# Patient Record
Sex: MALE | Race: WHITE | NOT HISPANIC OR LATINO | Employment: FULL TIME | URBAN - METROPOLITAN AREA
[De-identification: names, ages, dates, MRNs, and addresses within clinical notes are randomized per-mention and may not be internally consistent; named-entity substitution may affect disease eponyms.]

---
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
## 2021-01-18 ENCOUNTER — HOSPITAL ENCOUNTER (EMERGENCY)
Facility: CLINIC | Age: 21
Discharge: HOME | End: 2021-01-18
Admitting: EMERGENCY MEDICINE
Payer: COMMERCIAL

## 2021-01-18 VITALS
WEIGHT: 170 LBS | RESPIRATION RATE: 20 BRPM | HEART RATE: 58 BPM | BODY MASS INDEX: 23.03 KG/M2 | OXYGEN SATURATION: 96 % | SYSTOLIC BLOOD PRESSURE: 131 MMHG | TEMPERATURE: 98.7 F | DIASTOLIC BLOOD PRESSURE: 79 MMHG | HEIGHT: 72 IN

## 2021-01-18 LAB
BASOPHILS # BLD AUTO: 0.1 10E9/L (ref 0–0.2)
BASOPHILS NFR BLD AUTO: 0.7 %
DIFFERENTIAL METHOD BLD: NORMAL
EOSINOPHIL # BLD AUTO: 0.2 10E9/L (ref 0–0.7)
EOSINOPHIL NFR BLD AUTO: 1.9 %
ERYTHROCYTE [DISTWIDTH] IN BLOOD BY AUTOMATED COUNT: 12.7 % (ref 10–15)
HCT VFR BLD AUTO: 50.9 % (ref 40–53)
HGB BLD-MCNC: 17.3 G/DL (ref 13.3–17.7)
IMM GRANULOCYTES # BLD: 0.1 10E9/L (ref 0–0.4)
IMM GRANULOCYTES NFR BLD: 1.1 %
LYMPHOCYTES # BLD AUTO: 1.9 10E9/L (ref 0.8–5.3)
LYMPHOCYTES NFR BLD AUTO: 23.2 %
MCH RBC QN AUTO: 29.8 PG (ref 26.5–33)
MCHC RBC AUTO-ENTMCNC: 34 G/DL (ref 31.5–36.5)
MCV RBC AUTO: 88 FL (ref 78–100)
MONOCYTES # BLD AUTO: 0.5 10E9/L (ref 0–1.3)
MONOCYTES NFR BLD AUTO: 6.2 %
NEUTROPHILS # BLD AUTO: 5.5 10E9/L (ref 1.6–8.3)
NEUTROPHILS NFR BLD AUTO: 66.9 %
NRBC # BLD AUTO: 0 10*3/UL
NRBC BLD AUTO-RTO: 0 /100
PLATELET # BLD AUTO: 233 10E9/L (ref 150–450)
PLATELET # BLD EST: NORMAL 10*3/UL
RBC # BLD AUTO: 5.81 10E12/L (ref 4.4–5.9)
RBC MORPH BLD: NORMAL
WBC # BLD AUTO: 8.3 10E9/L (ref 4–11)

## 2021-01-18 PROCEDURE — 999N000104 HC STATISTIC NO CHARGE

## 2021-01-18 PROCEDURE — 85025 COMPLETE CBC W/AUTO DIFF WBC: CPT | Performed by: EMERGENCY MEDICINE

## 2021-01-18 ASSESSMENT — MIFFLIN-ST. JEOR: SCORE: 1824.11

## 2021-01-19 NOTE — ED TRIAGE NOTES
"Pt c/o 5/10 intermittent lower abdominal pain that started this AM. Pt states that it radiates down to his \"bladder\" Denies any N/V or urinary symptoms. Hx of Kidney Stones.  "

## 2021-06-04 VITALS
TEMPERATURE: 98 F | HEIGHT: 73 IN | HEART RATE: 70 BPM | WEIGHT: 168 LBS | SYSTOLIC BLOOD PRESSURE: 116 MMHG | BODY MASS INDEX: 22.26 KG/M2 | DIASTOLIC BLOOD PRESSURE: 55 MMHG

## 2021-06-04 VITALS — HEIGHT: 73 IN | BODY MASS INDEX: 21.11 KG/M2

## 2021-06-05 NOTE — PROGRESS NOTES
Assessment/Plan:        Diagnoses and all orders for this visit:    Calculus of kidney  -     Magnesium, 24 Hour Urine; Future  -     Stone Formation, 24 Hour Urine (does not include Magnesium); Standing  -     Uric Acid; Future; Expected date: 01/29/2020  -     Calcium, Ionized, Measured; Future; Expected date: 01/29/2020  -     Parathyroid Hormone Intact with Minerals; Future; Expected date: 01/29/2020  -     Patient Stated Goal: Prevent further stones  -     24 Hour Urine Collection Steps Education    Calculus of ureter  -     Urinalysis Macroscopic    Other orders  -     methylphenidate HCl (RITALIN) 20 MG tablet; Take 20 mg by mouth.      Stone Management Plan  [facility] Stone Management 1/15/2020   Urinary Tract Infection No suspicion of infection   Renal Colic Asymptomatic at this time   Renal Failure No suspicion of renal failure   Current CT date 1/9/2020   Right sided stones? Yes   R Number of ureteral stones 1   R GSD of ureteral stones 2   R Location of ureteral stone Mid   R Number of kidney stones  No renal stones   R GSD of kidney stones N/A   R Hydronephrosis Mild   R Stone Event New stone passed prior to visit   Left sided stones? Yes   L Number of ureteral stones No ureteral stones   L Number of kidney stones  2   L GSD of kidney stones 2 - 4   L Hydronephrosis None   L Stone Event No current event   L Current Plan Observe   Observe rationale Limited stone burden with good prognosis for spontaneous passage               Subjective:      HPI  Mr. Tavo Santana is a 19 y.o.  male presenting to the [facility] [place] following recent [facility] ER visit for urolithiasis.    He is a rapidly recurrent unidentified composition stone former who has not required stone clearance procedures. He has not previously participated in stone risk evaluation. He has no identified modifiable stone risk factors. He has identified non-modifiable stone risks including:  early age at first  stone.    He was seen in ER 1/8/20 for acute onset right abdominal pain x few hours. The pain was achy, constant and severe with radiation to the right back. He had associated nausea. Workup was notable for CT reporting an obstructing 3 mm right ureteral stone. Labs were negative for evidence of infection or renal injury. He had intractable pain despite medications and was admitted for observation. He passed the stone in interval with resolution of pain. He states the stone broke apart once collected and brings in a ziploc bag with several tiny yellow stone pieces.    He is asymptomatic at present. He denies symptoms of fever, chills, flank pain, nausea, vomiting, urinary frequency and dysuria.     CT scan from 1/9/20 is personally reviewed and demonstrates an obstructing 2 mm right mid ureteral stone which has passed in interval. There are 2 left renal stones, largest 4 mm.    Significant labs from presentation include moderate hematuria, no pyuria, negative nitrite, no bacteria, normal WBC, normal creatinine and normal potassium.    PLAN    20 yo M with with ADHD and hx of stone disease, no previous surgical stone interventions. Interval passage of small right ureteral stone. Nonobstructing left renal stones, new since 2018.    Will initiate comprehensive stone risk evaluation. Serum stone risk chemistries including uric acid, parathyroid hormone and ionized calcium will be obtained before next visit. Two 24 hour urine collections and dietary journal will be obtained at earliest covenience. Patient verbalized understanding. Patient agrees with plan as discussed. He will return to clinic when labs are available.      Patient also seen and examined by LEXX Lee   Review of Systems  A 12 point comprehensive review of systems is negative except for HPI    Past Medical History:   Diagnosis Date     ADHD      Kidney stone     5/2018 and 1/2020       No past surgical history on file.    Current  Outpatient Medications   Medication Sig Dispense Refill     methylphenidate HCl (RITALIN) 20 MG tablet Take 20 mg by mouth.       No current facility-administered medications for this visit.        Allergies no known allergies    Social History     Socioeconomic History     Marital status: Single     Spouse name: Not on file     Number of children: Not on file     Years of education: Not on file     Highest education level: Not on file   Occupational History     Occupation: student   Social Needs     Financial resource strain: Not on file     Food insecurity:     Worry: Not on file     Inability: Not on file     Transportation needs:     Medical: Not on file     Non-medical: Not on file   Tobacco Use     Smoking status: Never Smoker     Smokeless tobacco: Current User   Substance and Sexual Activity     Alcohol use: Not Currently     Drug use: Yes     Types: Marijuana     Sexual activity: Not on file   Lifestyle     Physical activity:     Days per week: Not on file     Minutes per session: Not on file     Stress: Not on file   Relationships     Social connections:     Talks on phone: Not on file     Gets together: Not on file     Attends Ca[employer] service: Not on file     Active member of club or organization: Not on file     Attends meetings of clubs or organizations: Not on file     Relationship status: Not on file     Intimate partner violence:     Fear of current or ex partner: Not on file     Emotionally abused: Not on file     Physically abused: Not on file     Forced sexual activity: Not on file   Other Topics Concern     Not on file   Social History Narrative     Not on file       Family History   Problem Relation Age of Onset     No Medical Problems Mother      No Medical Problems Father      No Medical Problems Sister      No Medical Problems Brother      No Medical Problems Maternal Aunt      No Medical Problems Maternal Uncle      No Medical Problems Paternal Aunt      Cancer Maternal Grandfather          pancreatic     Cancer Paternal Grandfather         lung        Objective:      Physical Exam  Vitals:    01/15/20 1458   BP: 116/55   Pulse: 70   Temp: 98  F (36.7  C)     General - well developed, well nourished, appropriate for age. Appears no distress at this time  Abdomen - slender soft, non-tender, no hepatosplenomegaly, no masses.   - no flank tenderness, no suprapubic tenderness, kidney and bladder non-palpable  MSK - normal spinal curvature. no spinal tenderness. normal gait. muscular strength intact.  Psych - oriented to time, place, and person, normal mood and affect.      Labs  Urinalysis POC (Office):  No results found for: BLOODUA, NITRITE, LEUKOCYTESUA, PHUA    Lab Urinalysis:  No results found for: HGBUA, NITRITE, LEUKOCYTESUR, PHUR and Acute Labs CBC No results found for: WBC, HGB, PLT and Renal Panel  KSINo results found for: CREATININE, CRCLEARANCE, K, CALCIUM      I, Eliu Patrick, personally saw and examined the patient, reviewed most recent labs and imaging and agree with the above assessment

## 2021-06-05 NOTE — PATIENT INSTRUCTIONS - HE
Patient Stated Goal: Prevent further stones  Steps for collecting a 24 hour urine specimen    Please follow the directions carefully. All urine voided for a 24-hour period needs to be collected into the jug.  DO NOT change any of your  normal  daily habits when doing this test. Continue to follow your regular diet, intake of fluids, and usual activity level. Pick the most convenient day with your schedule, perhaps on a weekend or a day off.    Start your Diet Log the day before collection and continue on the day of urine collection.  You MUST bring Diet Log with you on follow up visit to discuss results.    One 24hr Urine Collection     Two 24hr Urine Collections  (do not collect on consecutive days)    PLEASE COMPLETE THE 2nd JUG WITHIN 1-2 WEEKS FROM THE 1st JUG    STEP 1  Empty your bladder completely into the toilet. This will be your start time. Write your full legal name, start date and time on the jug label.  Collection start and stop times need to match exactly!  For example:  6 am to 6 am.    STEP 2  The next time you urinate, empty your bladder directly into the jug or collection hat and pour urine into the jug.  Screw the lid back onto the jug.  Do not spill!    STEP 3  Place the jug in the refrigerator or a cooler with ice during the collection period.  Failure to keep it cool could cause inaccurate test results. DO NOT Freeze.    STEP 4  Continue collecting all urine into the jug for the rest of the day, for the full 24 hours.  DO NOT stop early or go over 24 hours!    STEP 5  Exactly 24 hours from start of collection, write your full legal name, stop date and time on the jug label.   Collection start and stop times need to match exactly!  For example:  6 am to 6 am.  Failure to label correctly will result in recollection of urine specimen.    STEP 6  Return each jug within 24 hours after final urination.     STEP 7  Drop off jug locations:   [facility]: Mon-Fri 7am-7pm - Closed on  weekends  [facility]: Mon-Fri 7am-5pm - Closed on Sunday  [facility]: Mon-Fri 7am-6:30pm - Closed on weekends    STEP 8  Please call [facility] after return of your final jug to schedule your follow-up visit. [phone]

## 2021-06-05 NOTE — PROGRESS NOTES
Patient presents to the office today for a ureteral stone consultation from [facility].  He was last seen by Dr. Patrick at the [facility] in 2018.

## 2022-12-19 NOTE — ED NOTES
Bed: ED13  Expected date:   Expected time:   Means of arrival:   Comments:  triage   Impression: Age-related nuclear cataract, bilateral: H25.13. Bilateral. Plan: Discussed condition. Continue to monitor without treatment at this time.

## 2023-11-11 ENCOUNTER — APPOINTMENT (OUTPATIENT)
Dept: CT IMAGING | Facility: CLINIC | Age: 23
End: 2023-11-11
Attending: EMERGENCY MEDICINE
Payer: COMMERCIAL

## 2023-11-11 ENCOUNTER — HOSPITAL ENCOUNTER (EMERGENCY)
Facility: CLINIC | Age: 23
Discharge: HOME | End: 2023-11-11
Attending: EMERGENCY MEDICINE | Admitting: EMERGENCY MEDICINE
Payer: COMMERCIAL

## 2023-11-11 VITALS
RESPIRATION RATE: 16 BRPM | HEART RATE: 82 BPM | TEMPERATURE: 97.4 F | SYSTOLIC BLOOD PRESSURE: 119 MMHG | WEIGHT: 190 LBS | BODY MASS INDEX: 25.73 KG/M2 | HEIGHT: 72 IN | OXYGEN SATURATION: 100 % | DIASTOLIC BLOOD PRESSURE: 70 MMHG

## 2023-11-11 DIAGNOSIS — N23 RENAL COLIC: ICD-10-CM

## 2023-11-11 LAB
ALBUMIN SERPL BCG-MCNC: 4.8 G/DL (ref 3.5–5.2)
ALBUMIN UR-MCNC: NEGATIVE MG/DL
ALP SERPL-CCNC: 69 U/L (ref 40–129)
ALT SERPL W P-5'-P-CCNC: 34 U/L (ref 0–70)
ANION GAP SERPL CALCULATED.3IONS-SCNC: 12 MMOL/L (ref 7–15)
APPEARANCE UR: CLEAR
AST SERPL W P-5'-P-CCNC: 29 U/L (ref 0–45)
BASOPHILS # BLD AUTO: 0 10E3/UL (ref 0–0.2)
BASOPHILS NFR BLD AUTO: 0 %
BILIRUB SERPL-MCNC: 0.6 MG/DL
BILIRUB UR QL STRIP: NEGATIVE
BUN SERPL-MCNC: 13.5 MG/DL (ref 6–20)
CALCIUM SERPL-MCNC: 9.5 MG/DL (ref 8.6–10)
CHLORIDE SERPL-SCNC: 103 MMOL/L (ref 98–107)
COLOR UR AUTO: ABNORMAL
CREAT SERPL-MCNC: 1.05 MG/DL (ref 0.67–1.17)
DEPRECATED HCO3 PLAS-SCNC: 25 MMOL/L (ref 22–29)
EGFRCR SERPLBLD CKD-EPI 2021: >90 ML/MIN/1.73M2
EOSINOPHIL # BLD AUTO: 0.3 10E3/UL (ref 0–0.7)
EOSINOPHIL NFR BLD AUTO: 3 %
ERYTHROCYTE [DISTWIDTH] IN BLOOD BY AUTOMATED COUNT: 12 % (ref 10–15)
GLUCOSE SERPL-MCNC: 93 MG/DL (ref 70–99)
GLUCOSE UR STRIP-MCNC: NEGATIVE MG/DL
HCT VFR BLD AUTO: 41.8 % (ref 40–53)
HGB BLD-MCNC: 14.3 G/DL (ref 13.3–17.7)
HGB UR QL STRIP: ABNORMAL
IMM GRANULOCYTES # BLD: 0 10E3/UL
IMM GRANULOCYTES NFR BLD: 0 %
KETONES UR STRIP-MCNC: NEGATIVE MG/DL
LEUKOCYTE ESTERASE UR QL STRIP: ABNORMAL
LYMPHOCYTES # BLD AUTO: 3.3 10E3/UL (ref 0.8–5.3)
LYMPHOCYTES NFR BLD AUTO: 36 %
MCH RBC QN AUTO: 29 PG (ref 26.5–33)
MCHC RBC AUTO-ENTMCNC: 34.2 G/DL (ref 31.5–36.5)
MCV RBC AUTO: 85 FL (ref 78–100)
MONOCYTES # BLD AUTO: 0.6 10E3/UL (ref 0–1.3)
MONOCYTES NFR BLD AUTO: 6 %
MUCOUS THREADS #/AREA URNS LPF: PRESENT /LPF
NEUTROPHILS # BLD AUTO: 5 10E3/UL (ref 1.6–8.3)
NEUTROPHILS NFR BLD AUTO: 55 %
NITRATE UR QL: NEGATIVE
NRBC # BLD AUTO: 0 10E3/UL
NRBC BLD AUTO-RTO: 0 /100
PH UR STRIP: 5 [PH] (ref 5–7)
PLATELET # BLD AUTO: 288 10E3/UL (ref 150–450)
POTASSIUM SERPL-SCNC: 4.3 MMOL/L (ref 3.4–5.3)
PROT SERPL-MCNC: 8 G/DL (ref 6.4–8.3)
RBC # BLD AUTO: 4.93 10E6/UL (ref 4.4–5.9)
RBC URINE: 3 /HPF
SODIUM SERPL-SCNC: 140 MMOL/L (ref 135–145)
SP GR UR STRIP: 1.01 (ref 1–1.03)
UROBILINOGEN UR STRIP-MCNC: NORMAL MG/DL
WBC # BLD AUTO: 9.2 10E3/UL (ref 4–11)
WBC URINE: 1 /HPF

## 2023-11-11 PROCEDURE — 36415 COLL VENOUS BLD VENIPUNCTURE: CPT | Performed by: EMERGENCY MEDICINE

## 2023-11-11 PROCEDURE — 258N000003 HC RX IP 258 OP 636: Performed by: EMERGENCY MEDICINE

## 2023-11-11 PROCEDURE — 81001 URINALYSIS AUTO W/SCOPE: CPT | Performed by: EMERGENCY MEDICINE

## 2023-11-11 PROCEDURE — 74176 CT ABD & PELVIS W/O CONTRAST: CPT

## 2023-11-11 PROCEDURE — 80053 COMPREHEN METABOLIC PANEL: CPT | Performed by: EMERGENCY MEDICINE

## 2023-11-11 PROCEDURE — 96374 THER/PROPH/DIAG INJ IV PUSH: CPT | Mod: 59

## 2023-11-11 PROCEDURE — 85025 COMPLETE CBC W/AUTO DIFF WBC: CPT | Performed by: EMERGENCY MEDICINE

## 2023-11-11 PROCEDURE — 250N000011 HC RX IP 250 OP 636: Performed by: EMERGENCY MEDICINE

## 2023-11-11 PROCEDURE — 96375 TX/PRO/DX INJ NEW DRUG ADDON: CPT

## 2023-11-11 PROCEDURE — 96361 HYDRATE IV INFUSION ADD-ON: CPT

## 2023-11-11 PROCEDURE — 99285 EMERGENCY DEPT VISIT HI MDM: CPT | Mod: 25

## 2023-11-11 PROCEDURE — 96376 TX/PRO/DX INJ SAME DRUG ADON: CPT

## 2023-11-11 RX ORDER — ONDANSETRON 4 MG/1
4 TABLET, ORALLY DISINTEGRATING ORAL EVERY 8 HOURS PRN
Qty: 10 TABLET | Refills: 0 | Status: SHIPPED | OUTPATIENT
Start: 2023-11-11 | End: 2023-11-14

## 2023-11-11 RX ORDER — HYDROMORPHONE HYDROCHLORIDE 1 MG/ML
0.5 INJECTION, SOLUTION INTRAMUSCULAR; INTRAVENOUS; SUBCUTANEOUS ONCE
Status: COMPLETED | OUTPATIENT
Start: 2023-11-11 | End: 2023-11-11

## 2023-11-11 RX ORDER — OXYCODONE HYDROCHLORIDE 5 MG/1
5 TABLET ORAL EVERY 6 HOURS PRN
Qty: 12 TABLET | Refills: 0 | Status: SHIPPED | OUTPATIENT
Start: 2023-11-11 | End: 2023-11-14

## 2023-11-11 RX ORDER — IBUPROFEN 600 MG/1
600 TABLET, FILM COATED ORAL EVERY 6 HOURS PRN
Qty: 30 TABLET | Refills: 0 | Status: SHIPPED | OUTPATIENT
Start: 2023-11-11

## 2023-11-11 RX ORDER — TAMSULOSIN HYDROCHLORIDE 0.4 MG/1
0.4 CAPSULE ORAL DAILY
Qty: 10 CAPSULE | Refills: 0 | Status: SHIPPED | OUTPATIENT
Start: 2023-11-11 | End: 2023-11-21

## 2023-11-11 RX ORDER — KETOROLAC TROMETHAMINE 15 MG/ML
15 INJECTION, SOLUTION INTRAMUSCULAR; INTRAVENOUS ONCE
Status: DISCONTINUED | OUTPATIENT
Start: 2023-11-11 | End: 2023-11-11 | Stop reason: HOSPADM

## 2023-11-11 RX ORDER — FENTANYL CITRATE 50 UG/ML
50 INJECTION, SOLUTION INTRAMUSCULAR; INTRAVENOUS ONCE
Status: COMPLETED | OUTPATIENT
Start: 2023-11-11 | End: 2023-11-11

## 2023-11-11 RX ORDER — ONDANSETRON 2 MG/ML
4 INJECTION INTRAMUSCULAR; INTRAVENOUS ONCE
Status: COMPLETED | OUTPATIENT
Start: 2023-11-11 | End: 2023-11-11

## 2023-11-11 RX ADMIN — FENTANYL CITRATE 50 MCG: 50 INJECTION, SOLUTION INTRAMUSCULAR; INTRAVENOUS at 05:19

## 2023-11-11 RX ADMIN — HYDROMORPHONE HYDROCHLORIDE 0.5 MG: 1 INJECTION, SOLUTION INTRAMUSCULAR; INTRAVENOUS; SUBCUTANEOUS at 04:16

## 2023-11-11 RX ADMIN — ONDANSETRON 4 MG: 2 INJECTION INTRAMUSCULAR; INTRAVENOUS at 04:14

## 2023-11-11 RX ADMIN — SODIUM CHLORIDE 1000 ML: 9 INJECTION, SOLUTION INTRAVENOUS at 02:22

## 2023-11-11 RX ADMIN — SODIUM CHLORIDE 1000 ML: 9 INJECTION, SOLUTION INTRAVENOUS at 04:16

## 2023-11-11 RX ADMIN — ONDANSETRON 4 MG: 2 INJECTION INTRAMUSCULAR; INTRAVENOUS at 02:21

## 2023-11-11 ASSESSMENT — ACTIVITIES OF DAILY LIVING (ADL): ADLS_ACUITY_SCORE: 35

## 2023-11-11 NOTE — ED PROVIDER NOTES
History     Chief Complaint:  Flank Pain (Right sided flank pain )       HPI   Tavo Santana is a 23 year old male with known history of kidney stone presenting with flank pain.  Patient reports yesterday evening developing increasing right flank pain that radiates to his lower abdomen and has been more constant.  He expresses concerns for recurrent kidney stone.  He reports accompanying nausea and dysuria though denies any fever, chills, vomiting, hematuria, diarrhea, penile discharge.  No concerns for STIs.  He did take Aleve with minimal improvements in his symptoms.Last kidney stone roughly 2 years ago; has seen urology in the past.       Independent Historian:   None - Patient Only    Review of External Notes:   10/29/20 ED visit    Medications:    acetaminophen (TYLENOL) 325 MG tablet  docusate sodium (COLACE) 100 MG capsule  ibuprofen (ADVIL/MOTRIN) 200 MG tablet  ISOtretinoin (ACCUTANE PO)  Methylphenidate HCl (CONCERTA PO)  oxyCODONE IR (ROXICODONE) 5 MG tablet  Phenylephrine-DM-GG (GUIAFEN II DM PO)  predniSONE (DELTASONE) 20 MG tablet  Pseudoephedrine HCl (SUDAFED 12 HOUR PO)  tamsulosin (FLOMAX) 0.4 MG capsule        Past Medical History:    Past Medical History:   Diagnosis Date    ADHD     Kidney stone     Kidney stone        Past Surgical History:    No past surgical history on file.     Physical Exam   Patient Vitals for the past 24 hrs:   BP Temp Temp src Pulse Resp SpO2 Height Weight   11/11/23 0207 123/74 -- -- -- -- -- -- --   11/11/23 0206 -- -- -- -- -- -- -- 86.2 kg (190 lb)   11/11/23 0205 -- 97.4  F (36.3  C) Temporal 67 16 97 % 1.829 m (6') --        Physical Exam  Nursing note and vitals reviewed.  Constitutional: Well nourished.   Eyes: Conjunctiva normal.  Pupils are equal, round, and reactive to light.   ENT: Nose normal. Mucous membranes pink and moist.    Neck: Normal range of motion.  CVS: Normal rate, regular rhythm.  Normal heart sounds.   Pulmonary: Lungs clear to  auscultation bilaterally.   GI: Abdomen soft. Nontender, nondistended. No rigidity or guarding.  R. CVA tenderness  MSK: No calf tenderness or swelling.  Neuro: Alert. Follows simple commands.  Skin: Skin is warm and dry. No rash noted.   Psychiatric: Normal affect.       Emergency Department Course     Imaging:  Abd/pelvis CT no contrast - Stone Protocol   Final Result   IMPRESSION:    1.  Obstructive right distal ureteral calculi measuring 3 x 2 x 2.5 mm. With moderate right-sided hydroureteronephrosis proximally   2.  Bilateral nonobstructive collecting system calculi, the largest on the right is seen in the midpole measuring 3 mm the largest on the left measures 1.5 mm in the lower pole   3.  both             Laboratory:  Labs Ordered and Resulted from Time of ED Arrival to Time of ED Departure   ROUTINE UA WITH MICROSCOPIC REFLEX TO CULTURE - Abnormal       Result Value    Color Urine Straw      Appearance Urine Clear      Glucose Urine Negative      Bilirubin Urine Negative      Ketones Urine Negative      Specific Gravity Urine 1.007      Blood Urine Moderate (*)     pH Urine 5.0      Protein Albumin Urine Negative      Urobilinogen Urine Normal      Nitrite Urine Negative      Leukocyte Esterase Urine Trace (*)     Mucus Urine Present (*)     RBC Urine 3 (*)     WBC Urine 1     COMPREHENSIVE METABOLIC PANEL - Normal    Sodium 140      Potassium 4.3      Carbon Dioxide (CO2) 25      Anion Gap 12      Urea Nitrogen 13.5      Creatinine 1.05      GFR Estimate >90      Calcium 9.5      Chloride 103      Glucose 93      Alkaline Phosphatase 69      AST 29      ALT 34      Protein Total 8.0      Albumin 4.8      Bilirubin Total 0.6     CBC WITH PLATELETS AND DIFFERENTIAL    WBC Count 9.2      RBC Count 4.93      Hemoglobin 14.3      Hematocrit 41.8      MCV 85      MCH 29.0      MCHC 34.2      RDW 12.0      Platelet Count 288      % Neutrophils 55      % Lymphocytes 36      % Monocytes 6      % Eosinophils 3       % Basophils 0      % Immature Granulocytes 0      NRBCs per 100 WBC 0      Absolute Neutrophils 5.0      Absolute Lymphocytes 3.3      Absolute Monocytes 0.6      Absolute Eosinophils 0.3      Absolute Basophils 0.0      Absolute Immature Granulocytes 0.0      Absolute NRBCs 0.0              Emergency Department Course & Assessments:             Interventions:  Medications   sodium chloride 0.9% BOLUS 1,000 mL (0 mLs Intravenous Stopped 11/11/23 0308)   ondansetron (ZOFRAN) injection 4 mg (4 mg Intravenous $Given 11/11/23 0221)   sodium chloride 0.9% BOLUS 1,000 mL (0 mLs Intravenous Stopped 11/11/23 0515)   HYDROmorphone (PF) (DILAUDID) injection 0.5 mg (0.5 mg Intravenous $Given 11/11/23 0416)   ondansetron (ZOFRAN) injection 4 mg (4 mg Intravenous $Given 11/11/23 0414)   fentaNYL (PF) (SUBLIMAZE) injection 50 mcg (50 mcg Intravenous $Given 11/11/23 0519)       Consultations/Discussion of Management or Tests:  None        Social Determinants of Health affecting care:   None    Disposition:  The patient was discharged to home.     Impression & Plan        Medical Decision Making:  The patient presented with unilateral flank pain consistent with renal colic. CT confirms a ureteral stone with associated hydronephrosis. Incidental nonobstructing renal calculi identified as well.  Pain is controlled with interventions in the Emergency Department. There is no fever or evidence of a urinary tract infection. The patient will be discharged with opioid analgesics and Ibuprofen for pain. Zofran will be prescribed for nausea and flomax to faciliate passage of the kidney stone.  I considered other etiologies for these symptoms including AAA and pyelonephritis but these are unlikely given the otherwise normal CT scan and urinalysis.  The patient is instructed to return if increasing pain not controlled with pain meds, vomiting, and fever. Strain urine to look for stone, if detected, submit to primary doctor for lab analysis.  Instructions were given to follow up with urology within one week, sooner if pain continues, as retrieval of the stone may be required for refractory symptoms.        Diagnosis:    ICD-10-CM    1. Renal colic  N23            Discharge Medications:  Discharge Medication List as of 11/11/2023  5:52 AM        START taking these medications    Details   !! ibuprofen (ADVIL/MOTRIN) 600 MG tablet Take 1 tablet (600 mg) by mouth every 6 hours as needed for moderate pain, Disp-30 tablet, R-0, E-Prescribe      ondansetron (ZOFRAN ODT) 4 MG ODT tab Take 1 tablet (4 mg) by mouth every 8 hours as needed for nausea, Disp-10 tablet, R-0, E-Prescribe      !! oxyCODONE (ROXICODONE) 5 MG tablet Take 1 tablet (5 mg) by mouth every 6 hours as needed for pain, Disp-12 tablet, R-0, E-Prescribe      !! tamsulosin (FLOMAX) 0.4 MG capsule Take 1 capsule (0.4 mg) by mouth daily for 10 doses, Disp-10 capsule, R-0, E-Prescribe       !! - Potential duplicate medications found. Please discuss with provider.           11/11/2023   Clara Bernal, Clara Monet,   11/11/23 0815

## 2024-01-27 ENCOUNTER — APPOINTMENT (OUTPATIENT)
Dept: ULTRASOUND IMAGING | Facility: CLINIC | Age: 24
End: 2024-01-27
Payer: COMMERCIAL

## 2024-01-27 ENCOUNTER — HOSPITAL ENCOUNTER (EMERGENCY)
Facility: CLINIC | Age: 24
Discharge: HOME | End: 2024-01-27
Payer: COMMERCIAL

## 2024-01-27 VITALS
RESPIRATION RATE: 16 BRPM | TEMPERATURE: 98.2 F | HEART RATE: 54 BPM | HEIGHT: 72 IN | SYSTOLIC BLOOD PRESSURE: 114 MMHG | DIASTOLIC BLOOD PRESSURE: 66 MMHG | OXYGEN SATURATION: 97 % | BODY MASS INDEX: 25.06 KG/M2 | WEIGHT: 185 LBS

## 2024-01-27 DIAGNOSIS — N20.1 URETERAL STONE: ICD-10-CM

## 2024-01-27 DIAGNOSIS — N23 RENAL COLIC: ICD-10-CM

## 2024-01-27 LAB
ALBUMIN SERPL BCG-MCNC: 4.9 G/DL (ref 3.5–5.2)
ALBUMIN UR-MCNC: NEGATIVE MG/DL
ALP SERPL-CCNC: 82 U/L (ref 40–150)
ALT SERPL W P-5'-P-CCNC: 26 U/L (ref 0–70)
ANION GAP SERPL CALCULATED.3IONS-SCNC: 10 MMOL/L (ref 7–15)
APPEARANCE UR: CLEAR
AST SERPL W P-5'-P-CCNC: 20 U/L (ref 0–45)
BASOPHILS # BLD AUTO: 0 10E3/UL (ref 0–0.2)
BASOPHILS NFR BLD AUTO: 0 %
BILIRUB SERPL-MCNC: 0.9 MG/DL
BILIRUB UR QL STRIP: NEGATIVE
BUN SERPL-MCNC: 9.2 MG/DL (ref 6–20)
CALCIUM SERPL-MCNC: 10 MG/DL (ref 8.6–10)
CHLORIDE SERPL-SCNC: 100 MMOL/L (ref 98–107)
COLOR UR AUTO: ABNORMAL
CREAT SERPL-MCNC: 1.03 MG/DL (ref 0.67–1.17)
DEPRECATED HCO3 PLAS-SCNC: 27 MMOL/L (ref 22–29)
EGFRCR SERPLBLD CKD-EPI 2021: >90 ML/MIN/1.73M2
EOSINOPHIL # BLD AUTO: 0.2 10E3/UL (ref 0–0.7)
EOSINOPHIL NFR BLD AUTO: 2 %
ERYTHROCYTE [DISTWIDTH] IN BLOOD BY AUTOMATED COUNT: 11.9 % (ref 10–15)
GLUCOSE SERPL-MCNC: 99 MG/DL (ref 70–99)
GLUCOSE UR STRIP-MCNC: NEGATIVE MG/DL
HCT VFR BLD AUTO: 47.1 % (ref 40–53)
HGB BLD-MCNC: 16.1 G/DL (ref 13.3–17.7)
HGB UR QL STRIP: ABNORMAL
IMM GRANULOCYTES # BLD: 0 10E3/UL
IMM GRANULOCYTES NFR BLD: 0 %
KETONES UR STRIP-MCNC: NEGATIVE MG/DL
LEUKOCYTE ESTERASE UR QL STRIP: ABNORMAL
LYMPHOCYTES # BLD AUTO: 1.8 10E3/UL (ref 0.8–5.3)
LYMPHOCYTES NFR BLD AUTO: 25 %
MCH RBC QN AUTO: 28.6 PG (ref 26.5–33)
MCHC RBC AUTO-ENTMCNC: 34.2 G/DL (ref 31.5–36.5)
MCV RBC AUTO: 84 FL (ref 78–100)
MONOCYTES # BLD AUTO: 0.4 10E3/UL (ref 0–1.3)
MONOCYTES NFR BLD AUTO: 5 %
NEUTROPHILS # BLD AUTO: 4.9 10E3/UL (ref 1.6–8.3)
NEUTROPHILS NFR BLD AUTO: 68 %
NITRATE UR QL: NEGATIVE
NRBC # BLD AUTO: 0 10E3/UL
NRBC BLD AUTO-RTO: 0 /100
PH UR STRIP: 6.5 [PH] (ref 5–7)
PLATELET # BLD AUTO: 267 10E3/UL (ref 150–450)
POTASSIUM SERPL-SCNC: 4.5 MMOL/L (ref 3.4–5.3)
PROT SERPL-MCNC: 8.1 G/DL (ref 6.4–8.3)
RBC # BLD AUTO: 5.62 10E6/UL (ref 4.4–5.9)
RBC URINE: 16 /HPF
SODIUM SERPL-SCNC: 137 MMOL/L (ref 135–145)
SP GR UR STRIP: 1.01 (ref 1–1.03)
UROBILINOGEN UR STRIP-MCNC: NORMAL MG/DL
WBC # BLD AUTO: 7.3 10E3/UL (ref 4–11)
WBC URINE: 2 /HPF

## 2024-01-27 PROCEDURE — 96360 HYDRATION IV INFUSION INIT: CPT

## 2024-01-27 PROCEDURE — 96361 HYDRATE IV INFUSION ADD-ON: CPT

## 2024-01-27 PROCEDURE — 258N000003 HC RX IP 258 OP 636

## 2024-01-27 PROCEDURE — 99284 EMERGENCY DEPT VISIT MOD MDM: CPT | Mod: 25

## 2024-01-27 PROCEDURE — 76775 US EXAM ABDO BACK WALL LIM: CPT

## 2024-01-27 PROCEDURE — 82247 BILIRUBIN TOTAL: CPT

## 2024-01-27 PROCEDURE — 81001 URINALYSIS AUTO W/SCOPE: CPT

## 2024-01-27 PROCEDURE — 36415 COLL VENOUS BLD VENIPUNCTURE: CPT

## 2024-01-27 PROCEDURE — 250N000011 HC RX IP 250 OP 636

## 2024-01-27 PROCEDURE — 85025 COMPLETE CBC W/AUTO DIFF WBC: CPT

## 2024-01-27 PROCEDURE — 250N000013 HC RX MED GY IP 250 OP 250 PS 637

## 2024-01-27 RX ORDER — OXYCODONE HYDROCHLORIDE 5 MG/1
5 TABLET ORAL ONCE
Status: COMPLETED | OUTPATIENT
Start: 2024-01-27 | End: 2024-01-27

## 2024-01-27 RX ORDER — ONDANSETRON 4 MG/1
4 TABLET, ORALLY DISINTEGRATING ORAL ONCE
Status: COMPLETED | OUTPATIENT
Start: 2024-01-27 | End: 2024-01-27

## 2024-01-27 RX ORDER — TAMSULOSIN HYDROCHLORIDE 0.4 MG/1
0.4 CAPSULE ORAL DAILY
Qty: 10 CAPSULE | Refills: 0 | Status: SHIPPED | OUTPATIENT
Start: 2024-01-27 | End: 2024-02-06

## 2024-01-27 RX ORDER — OXYCODONE HYDROCHLORIDE 5 MG/1
5 TABLET ORAL EVERY 6 HOURS PRN
Qty: 8 TABLET | Refills: 0 | Status: SHIPPED | OUTPATIENT
Start: 2024-01-27

## 2024-01-27 RX ORDER — TAMSULOSIN HYDROCHLORIDE 0.4 MG/1
0.4 CAPSULE ORAL ONCE
Status: COMPLETED | OUTPATIENT
Start: 2024-01-27 | End: 2024-01-27

## 2024-01-27 RX ORDER — KETOROLAC TROMETHAMINE 15 MG/ML
15 INJECTION, SOLUTION INTRAMUSCULAR; INTRAVENOUS ONCE
Status: COMPLETED | OUTPATIENT
Start: 2024-01-27 | End: 2024-01-27

## 2024-01-27 RX ADMIN — TAMSULOSIN HYDROCHLORIDE 0.4 MG: 0.4 CAPSULE ORAL at 17:47

## 2024-01-27 RX ADMIN — SODIUM CHLORIDE 1000 ML: 9 INJECTION, SOLUTION INTRAVENOUS at 15:40

## 2024-01-27 RX ADMIN — ONDANSETRON 4 MG: 4 TABLET, ORALLY DISINTEGRATING ORAL at 17:00

## 2024-01-27 RX ADMIN — OXYCODONE HYDROCHLORIDE 5 MG: 5 TABLET ORAL at 17:00

## 2024-01-27 ASSESSMENT — ACTIVITIES OF DAILY LIVING (ADL)
ADLS_ACUITY_SCORE: 35
ADLS_ACUITY_SCORE: 35

## 2024-01-27 NOTE — ED TRIAGE NOTES
Right flank pain beginning this afternoon with frequent urination. Hx kidney stones.      Triage Assessment (Adult)       Row Name 01/27/24 1500          Triage Assessment    Airway WDL WDL        Respiratory WDL    Respiratory WDL WDL        Skin Circulation/Temperature WDL    Skin Circulation/Temperature WDL WDL        Cardiac WDL    Cardiac WDL WDL        Peripheral/Neurovascular WDL    Peripheral Neurovascular WDL WDL        Cognitive/Neuro/Behavioral WDL    Cognitive/Neuro/Behavioral WDL WDL

## 2024-01-27 NOTE — ED PROVIDER NOTES
History     Chief Complaint:  Flank Pain       HPI   Tavo Santana is a 23 year old male history of kidney stones who presents to the emergency department with sudden onset right flank pain that started 2 hours prior to arrival.  He states this feels like previous kidney stones.  His last kidney stone was 11/11/2023.  He was seen in the ED for this and had a CT showing a right obstructing distal ureteral stone with moderate hydronephrosis.  The patient passed the stone.  The CT also showed bilateral nonobstructing calculi, the largest on the right was seen in the midpole measuring 3 mm.  Patient states his pain has been waxing and waning.  Right now it is 5/10.  He mentions some urinary frequency and urgency.  Denies burning with urination.  No gross hematuria.  No nausea or vomiting.  No fevers or chills.  Patient states the only medication he takes is Lexapro.  Recently stopped Klonopin.  Denies any chest pain or shortness of breath.  Patient had his last CT in November and is concerned about the risk of radiation with getting other CT.      Independent Historian:    Patient    Review of External Notes:  11/11/23 seen in ED for renal colic. CT showed an obstructing stone, as well as bilateral non obstructing stones. CT impression per below:  IMPRESSION:   1.  Obstructive right distal ureteral calculi measuring 3 x 2 x 2.5 mm. With moderate right-sided hydroureteronephrosis proximally  2.  Bilateral nonobstructive collecting system calculi, the largest on the right is seen in the midpole measuring 3 mm the largest on the left measures 1.5 mm in the lower pole    1/8/20 seen in ED for right ureteral stone    Medications:    Lexapro    Past Medical History:    Past Medical History:   Diagnosis Date    ADHD     Kidney stone     Kidney stone        Past Surgical History:    Has never needed any stents or urologic procedures      Physical Exam   Patient Vitals for the past 24 hrs:   BP Temp Temp src Pulse Resp  SpO2 Height Weight   01/27/24 1807 114/66 98.2  F (36.8  C) Oral -- -- -- -- --   01/27/24 1522 114/66 98  F (36.7  C) Oral 54 16 97 % -- --   01/27/24 1459 135/88 97  F (36.1  C) Temporal 56 16 99 % 1.829 m (6') 83.9 kg (185 lb)        Physical Exam  General: Nontoxic-appearing young adult male.  Normal body habitus.  HENT:   Head: Atraumatic.  Mouth/Throat: Oropharynx clear and moist.  Eyes: Conjunctive and EOM normal. PERRLA.  Neck: Normal ROM. No rigidity.  CV: Regular rate and rhythm.   Resp: Lungs clear to auscultation bilaterally. Normal respiratory effort.   GI: Bowel sounds normal. Abdomen soft, non distended and nontender. No rebound or guarding.  Patient has some right CVA tenderness.  MSK: Normal range of motion.  Skin: Warm and dry.  No rash.  Neuro: Awake, alert, oriented x 3.  Psych: Normal mood and affect.      Emergency Department Course     Imaging:  US Renal Limited   Final Result   IMPRESSION:   1.  Mild right hydronephrosis. No sonographically evident proximal ureteral stone.   2.  Nonobstructing nephrolithiasis bilaterally.        Report per radiology    Laboratory:  Labs Ordered and Resulted from Time of ED Arrival to Time of ED Departure   ROUTINE UA WITH MICROSCOPIC REFLEX TO CULTURE - Abnormal       Result Value    Color Urine Straw      Appearance Urine Clear      Glucose Urine Negative      Bilirubin Urine Negative      Ketones Urine Negative      Specific Gravity Urine 1.007      Blood Urine Moderate (*)     pH Urine 6.5      Protein Albumin Urine Negative      Urobilinogen Urine Normal      Nitrite Urine Negative      Leukocyte Esterase Urine Trace (*)     RBC Urine 16 (*)     WBC Urine 2     COMPREHENSIVE METABOLIC PANEL - Normal    Sodium 137      Potassium 4.5      Carbon Dioxide (CO2) 27      Anion Gap 10      Urea Nitrogen 9.2      Creatinine 1.03      GFR Estimate >90      Calcium 10.0      Chloride 100      Glucose 99      Alkaline Phosphatase 82      AST 20      ALT 26       Protein Total 8.1      Albumin 4.9      Bilirubin Total 0.9     CBC WITH PLATELETS AND DIFFERENTIAL    WBC Count 7.3      RBC Count 5.62      Hemoglobin 16.1      Hematocrit 47.1      MCV 84      MCH 28.6      MCHC 34.2      RDW 11.9      Platelet Count 267      % Neutrophils 68      % Lymphocytes 25      % Monocytes 5      % Eosinophils 2      % Basophils 0      % Immature Granulocytes 0      NRBCs per 100 WBC 0      Absolute Neutrophils 4.9      Absolute Lymphocytes 1.8      Absolute Monocytes 0.4      Absolute Eosinophils 0.2      Absolute Basophils 0.0      Absolute Immature Granulocytes 0.0      Absolute NRBCs 0.0              Emergency Department Course & Assessments:      Interventions:  Medications   sodium chloride 0.9% BOLUS 1,000 mL (0 mLs Intravenous Stopped 1/27/24 1747)   ketorolac (TORADOL) injection 15 mg (15 mg Intravenous Not Given 1/27/24 1639)   oxyCODONE (ROXICODONE) tablet 5 mg (5 mg Oral $Given 1/27/24 1700)   ondansetron (ZOFRAN ODT) ODT tab 4 mg (4 mg Oral $Given 1/27/24 1700)   tamsulosin (FLOMAX) capsule 0.4 mg (0.4 mg Oral $Given 1/27/24 1747)        Assessments:  3:05pm I saw and evaluated the patient.    4:35pm Rechecked the patient. Awaiting US results and UA. Cancelled Toradol since mom states he had Advil at 2:30pm. Will try oxycodone and zofran.    5:22pm Recheck.    Independent Interpretation (X-rays, CTs, rhythm strip):  Mild right hydronephrosis on US      Social Determinants of Health affecting care:  None     Disposition:  The patient was discharged to home.     Impression & Plan      Medical Decision Making:  Adrian is a pleasant 23-year-old male with a history of kidney stones who came into the emergency department today for sudden onset flank pain that started 2 hours prior to arrival and feels like his typical kidney stone pain.  He has had a history of 3 stones in the past.  He had a recent CT done in November in the ED for renal colic.  CT showed an obstructing stone as well  as bilateral nonobstructing stones.  On arrival here he is afebrile and nontoxic-appearing.  Pain level is at about a 5/10.  He was provided oxycodone as he could not take Toradol since he already took Advil prior to arrival.  Urine obtained and showed a large amount of blood suspicious for stone.  There was no UTI noted on UA.  CBC without leukocytosis or anemia.  Metabolic panel without acute electrolyte derangement or acute kidney injury.  Renal ultrasound obtained and showed mild right hydronephrosis.  Likely one of the smaller stones that had been on previous CT is causing some obstruction.  However the large stone that was seen previously on CT is still intrarenal on ultrasound.  Patient was provided fluids and Flomax here.  We did not pursue CT scan here since he had one recently and we wanted to be judicious about radiation.  He feels comfortable with discharging home and managing with the ibuprofen he has at home, the Zofran he has at home.  I have prescribed him daily Flomax and several oxycodone tablets for severe breakthrough pain.  He will return to the emergency department if he develops severe uncontrollable pain, intractable vomiting, fever, dysuria or any other new concerns.  I have referred him to urology and he will get a call from [facility] for close recheck.  He was given a strainer at the time of discharge to try to catch the stone.  Patient remained hemodynamically stable throughout his course and expressed clear understanding of return precautions.        Diagnosis:    ICD-10-CM    1. Renal colic  N23 Adult Urology  Referral      2. Ureteral stone  N20.1 Adult Urology  Referral           Discharge Medications:  Patient prescribed oxycodone for severe breakthrough pain  He already has 600 mg tablets of ibuprofen at home  He has Zofran already at home  He was prescribed Flomax to take daily      1/27/2024   Stacy Ellington, Stacy Fuentes,  LEXX  01/27/24 2112

## 2024-07-16 ENCOUNTER — OFFICE VISIT (OUTPATIENT)
Dept: UROLOGY | Facility: CLINIC | Age: 24
End: 2024-07-16
Payer: COMMERCIAL

## 2024-07-16 ENCOUNTER — HOSPITAL ENCOUNTER (OUTPATIENT)
Dept: GENERAL RADIOLOGY | Facility: CLINIC | Age: 24
Discharge: HOME | End: 2024-07-16
Attending: UROLOGY | Admitting: UROLOGY
Payer: COMMERCIAL

## 2024-07-16 VITALS — OXYGEN SATURATION: 97 % | SYSTOLIC BLOOD PRESSURE: 121 MMHG | DIASTOLIC BLOOD PRESSURE: 66 MMHG | HEART RATE: 54 BPM

## 2024-07-16 DIAGNOSIS — N23 RENAL COLIC: ICD-10-CM

## 2024-07-16 DIAGNOSIS — N20.1 URETERAL STONE: ICD-10-CM

## 2024-07-16 PROCEDURE — 99203 OFFICE O/P NEW LOW 30 MIN: CPT | Performed by: UROLOGY

## 2024-07-16 PROCEDURE — 74018 RADEX ABDOMEN 1 VIEW: CPT

## 2024-07-16 RX ORDER — ESCITALOPRAM OXALATE 10 MG/1
10 TABLET ORAL
COMMUNITY
Start: 2024-01-02

## 2024-07-16 RX ORDER — ZOLPIDEM TARTRATE 10 MG/1
10 TABLET ORAL
COMMUNITY

## 2024-07-16 NOTE — PROGRESS NOTES
[facility]  Main Office: [address]  [address]  [place] [address]       CHIEF COMPLAINT:  History of kidney stones    HISTORY:   This is a 24-year-old who has previously seen Dr. Patrick and also [facility] for kidney stones.  He lives in [place] but works near our office so he requested [place] location.  He reports that he underwent a shot lithotripsy with [facility] a few years ago.  His most recent stone history is that in November of last year he was diagnosed with a right ureteral stone that he was passing.  He said that he thinks he passed that stone but did not necessarily catch it and a strainer.  He has had no pain for at least the past 6 months.  He is interested in being evaluated for further kidney stones.  He says he has passed multiple stones  previously as well.      PAST MEDICAL HISTORY:   Past Medical History:   Diagnosis Date    ADHD     Kidney stone     Kidney stone     5/2018 and 1/2020       PAST SURGICAL HISTORY: History reviewed. No pertinent surgical history.    FAMILY HISTORY:   Family History   Problem Relation Age of Onset    No Known Problems Mother     No Known Problems Father     No Known Problems Sister     No Known Problems Brother     No Known Problems Maternal Aunt     No Known Problems Maternal Uncle     No Known Problems Paternal Aunt     Cancer Maternal Grandfather         pancreatic    Cancer Paternal Grandfather         lung        SOCIAL HISTORY:   Social History     Tobacco Use    Smoking status: Never    Smokeless tobacco: Current   Substance Use Topics    Alcohol use: Yes     Alcohol/week: 2.0 standard drinks of alcohol     Types: 2 Cans of beer per week        No Known Allergies      Current Outpatient Medications:     escitalopram (LEXAPRO) 10 MG tablet, Take 10 mg by mouth, Disp: , Rfl:     zolpidem (AMBIEN) 10 MG tablet, Take 10 mg by mouth nightly as needed, Disp: , Rfl:     acetaminophen (TYLENOL) 325 MG tablet, Take 2 tablets (650  mg) by mouth every 6 hours (Patient not taking: Reported on 7/16/2024), Disp: 60 tablet, Rfl: 0    docusate sodium (COLACE) 100 MG capsule, Take 1 capsule (100 mg) by mouth 2 times daily as needed for constipation (Patient not taking: Reported on 7/16/2024), Disp: 40 capsule, Rfl: 0    ibuprofen (ADVIL/MOTRIN) 200 MG tablet, Take 1 tablet (200 mg) by mouth every 6 hours (Patient not taking: Reported on 7/16/2024), Disp: 100 tablet, Rfl: 0    ibuprofen (ADVIL/MOTRIN) 600 MG tablet, Take 1 tablet (600 mg) by mouth every 6 hours as needed for moderate pain (Patient not taking: Reported on 7/16/2024), Disp: 30 tablet, Rfl: 0    ISOtretinoin (ACCUTANE PO), , Disp: , Rfl:     Methylphenidate HCl (CONCERTA PO), , Disp: , Rfl:     oxyCODONE (ROXICODONE) 5 MG tablet, Take 1 tablet (5 mg) by mouth every 6 hours as needed for moderate to severe pain (Patient not taking: Reported on 7/16/2024), Disp: 8 tablet, Rfl: 0    Phenylephrine-DM-GG (GUIAFEN II DM PO), , Disp: , Rfl:     predniSONE (DELTASONE) 20 MG tablet, Take two tablets (= 40mg) each day for 4 (four) days, then 1 tablet (20 mg) once daily for 2 days (Patient not taking: Reported on 7/16/2024), Disp: 10 tablet, Rfl: 0    Pseudoephedrine HCl (SUDAFED 12 HOUR PO), , Disp: , Rfl:     Review Of Systems:  Skin: No rash, pruritis, or skin pigmentation  Eyes: No changes in vision  Ears/Nose/Throat: No changes in hearing, no nosebleeds  Respiratory: No shortness of breath, dyspnea on exertion, cough, or hemoptysis  Cardiovascular: No chest pain or palpitations  Gastrointestinal: No diarrhea or constipation. No abdominal pain. No hematochezia  Genitourinary: see HPI  Musculoskeletal: No pain or swelling of joints, normal range of motion  Neurologic: No weakness or tremors  Psychiatric: No recent changes in memory or mood  Hematologic/Lymphatic/Immunologic: No easy bruising or enlarged lymph nodes  Endocrine: No weight gain or loss      PHYSICAL EXAM:    /66   Pulse 54    SpO2 97%   General appearance: In NAD, conversant  HEENT: Normocephalic and atraumatic, anicteric sclera  Cardiovascular: Not examined  Respiratory: normal, non-labored breathing  Gastrointestinal: negative, Abdomen soft, non-tender, and non-distended.   Musculoskeletal: Not Examined  Peripheral Vascular/extremity: No peripheral edema  Skin: Normal temperature, turgor, and texture. No rash  Psychiatric: Appropriate affect, alert and oriented to person, place, and time    Penis: Not Examined  Scrotal Skin: Not Examined   Testicles: Not Examined  Epididymis: Not Examined  Lymphatic: Not examined  Digital Rectal Exam:     Cystoscopy: Not done      PSA:     UA RESULTS:  Recent Labs   Lab Test 01/27/24  1659 01/23/20  1300 01/15/20  1450   COLOR Straw   < > Yellow   APPEARANCE Clear   < > Clear   URINEGLC Negative   < > Negative   URINEBILI Negative   < > Negative   URINEKETONE Negative   < > Negative   SG 1.007   < > 1.025   UBLD Moderate*   < > Trace*   URINEPH 6.5   < > 6.0   PROTEIN Negative   < > Negative   UROBILINOGEN  --   --  0.2 E.U./dL   NITRITE Negative   < > Negative   LEUKEST Trace*   < > Negative   RBCU 16*   < >  --    WBCU 2   < >  --     < > = values in this interval not displayed.       Bladder Scan:     Other Labs:      Imaging Studies: None      CLINICAL IMPRESSION:   History of kidney stones    PLAN:   He has a history of kidney stones.  He currently has no symptoms.  He is unable to provide a urine sample today screen for hematuria.  It has been nearly 9 months since he was last imaged for kidney stones so I recommended a KUB to monitor  intervertebral for any larger stones.  He agreed to the plan.  I will call him  when the KUB results are available.    ADDENDUM: Spoke on phone re: KUB results. Tiny stone left kidney. I recommended he see one of our advanced practice providers in 2 years with a KUB to monitor for more stones      Rudi Guerrero MD

## 2024-07-16 NOTE — NURSING NOTE
Pt had CT in Nov.... in [facility].  Pt denies any pain right now.    Pt denies gross hem.    LLEE Easley CMA

## 2024-07-16 NOTE — LETTER
7/16/2024       RE: Tavo Santana  [address]  [place] [address]     Dear Colleague,    Thank you for referring your patient, Tavo Santana, to the [facility] [place] [facility]. Please see a copy of my visit note below.    [facility]  Main Office: [address]  [address]  [place] [address]       CHIEF COMPLAINT:  History of kidney stones    HISTORY:   This is a 24-year-old who has previously seen Dr. Patrick and also [facility] for kidney stones.  He lives in [place] but works near our office so he requested [place] location.  He reports that he underwent a shot lithotripsy with [facility] a few years ago.  His most recent stone history is that in November of last year he was diagnosed with a right ureteral stone that he was passing.  He said that he thinks he passed that stone but did not necessarily catch it and a strainer.  He has had no pain for at least the past 6 months.  He is interested in being evaluated for further kidney stones.  He says he has passed multiple stones  previously as well.      PAST MEDICAL HISTORY:   Past Medical History:   Diagnosis Date    ADHD     Kidney stone     Kidney stone     5/2018 and 1/2020       PAST SURGICAL HISTORY: History reviewed. No pertinent surgical history.    FAMILY HISTORY:   Family History   Problem Relation Age of Onset    No Known Problems Mother     No Known Problems Father     No Known Problems Sister     No Known Problems Brother     No Known Problems Maternal Aunt     No Known Problems Maternal Uncle     No Known Problems Paternal Aunt     Cancer Maternal Grandfather         pancreatic    Cancer Paternal Grandfather         lung        SOCIAL HISTORY:   Social History     Tobacco Use    Smoking status: Never    Smokeless tobacco: Current   Substance Use Topics    Alcohol use: Yes     Alcohol/week: 2.0 standard drinks of alcohol     Types: 2  Cans of beer per week        No Known Allergies      Current Outpatient Medications:     escitalopram (LEXAPRO) 10 MG tablet, Take 10 mg by mouth, Disp: , Rfl:     zolpidem (AMBIEN) 10 MG tablet, Take 10 mg by mouth nightly as needed, Disp: , Rfl:     acetaminophen (TYLENOL) 325 MG tablet, Take 2 tablets (650 mg) by mouth every 6 hours (Patient not taking: Reported on 7/16/2024), Disp: 60 tablet, Rfl: 0    docusate sodium (COLACE) 100 MG capsule, Take 1 capsule (100 mg) by mouth 2 times daily as needed for constipation (Patient not taking: Reported on 7/16/2024), Disp: 40 capsule, Rfl: 0    ibuprofen (ADVIL/MOTRIN) 200 MG tablet, Take 1 tablet (200 mg) by mouth every 6 hours (Patient not taking: Reported on 7/16/2024), Disp: 100 tablet, Rfl: 0    ibuprofen (ADVIL/MOTRIN) 600 MG tablet, Take 1 tablet (600 mg) by mouth every 6 hours as needed for moderate pain (Patient not taking: Reported on 7/16/2024), Disp: 30 tablet, Rfl: 0    ISOtretinoin (ACCUTANE PO), , Disp: , Rfl:     Methylphenidate HCl (CONCERTA PO), , Disp: , Rfl:     oxyCODONE (ROXICODONE) 5 MG tablet, Take 1 tablet (5 mg) by mouth every 6 hours as needed for moderate to severe pain (Patient not taking: Reported on 7/16/2024), Disp: 8 tablet, Rfl: 0    Phenylephrine-DM-GG (GUIAFEN II DM PO), , Disp: , Rfl:     predniSONE (DELTASONE) 20 MG tablet, Take two tablets (= 40mg) each day for 4 (four) days, then 1 tablet (20 mg) once daily for 2 days (Patient not taking: Reported on 7/16/2024), Disp: 10 tablet, Rfl: 0    Pseudoephedrine HCl (SUDAFED 12 HOUR PO), , Disp: , Rfl:     Review Of Systems:  Skin: No rash, pruritis, or skin pigmentation  Eyes: No changes in vision  Ears/Nose/Throat: No changes in hearing, no nosebleeds  Respiratory: No shortness of breath, dyspnea on exertion, cough, or hemoptysis  Cardiovascular: No chest pain or palpitations  Gastrointestinal: No diarrhea or constipation. No abdominal pain. No hematochezia  Genitourinary: see  HPI  Musculoskeletal: No pain or swelling of joints, normal range of motion  Neurologic: No weakness or tremors  Psychiatric: No recent changes in memory or mood  Hematologic/Lymphatic/Immunologic: No easy bruising or enlarged lymph nodes  Endocrine: No weight gain or loss      PHYSICAL EXAM:    /66   Pulse 54   SpO2 97%   General appearance: In NAD, conversant  HEENT: Normocephalic and atraumatic, anicteric sclera  Cardiovascular: Not examined  Respiratory: normal, non-labored breathing  Gastrointestinal: negative, Abdomen soft, non-tender, and non-distended.   Musculoskeletal: Not Examined  Peripheral Vascular/extremity: No peripheral edema  Skin: Normal temperature, turgor, and texture. No rash  Psychiatric: Appropriate affect, alert and oriented to person, place, and time    Penis: Not Examined  Scrotal Skin: Not Examined   Testicles: Not Examined  Epididymis: Not Examined  Lymphatic: Not examined  Digital Rectal Exam:     Cystoscopy: Not done      PSA:     UA RESULTS:  Recent Labs   Lab Test 01/27/24  1659 01/23/20  1300 01/15/20  1450   COLOR Straw   < > Yellow   APPEARANCE Clear   < > Clear   URINEGLC Negative   < > Negative   URINEBILI Negative   < > Negative   URINEKETONE Negative   < > Negative   SG 1.007   < > 1.025   UBLD Moderate*   < > Trace*   URINEPH 6.5   < > 6.0   PROTEIN Negative   < > Negative   UROBILINOGEN  --   --  0.2 E.U./dL   NITRITE Negative   < > Negative   LEUKEST Trace*   < > Negative   RBCU 16*   < >  --    WBCU 2   < >  --     < > = values in this interval not displayed.       Bladder Scan:     Other Labs:      Imaging Studies: None      CLINICAL IMPRESSION:   History of kidney stones    PLAN:   He has a history of kidney stones.  He currently has no symptoms.  He is unable to provide a urine sample today screen for hematuria.  It has been nearly 9 months since he was last imaged for kidney stones so I recommended a KUB to monitor  intervertebral for any larger stones.  He  agreed to the plan.  I will call him  when the KUB results are available.      Rudi Guerrero MD

## 2024-09-14 ENCOUNTER — HEALTH MAINTENANCE LETTER (OUTPATIENT)
Age: 24
End: 2024-09-14